# Patient Record
Sex: FEMALE | Race: AMERICAN INDIAN OR ALASKA NATIVE
[De-identification: names, ages, dates, MRNs, and addresses within clinical notes are randomized per-mention and may not be internally consistent; named-entity substitution may affect disease eponyms.]

---

## 2018-01-01 ENCOUNTER — HOSPITAL ENCOUNTER (EMERGENCY)
Dept: HOSPITAL 5 - ED | Age: 63
LOS: 1 days | Discharge: HOME | End: 2018-01-02
Payer: COMMERCIAL

## 2018-01-01 DIAGNOSIS — M25.562: Primary | ICD-10-CM

## 2018-01-01 DIAGNOSIS — M25.512: ICD-10-CM

## 2018-01-01 DIAGNOSIS — M25.552: ICD-10-CM

## 2018-01-01 DIAGNOSIS — Z88.6: ICD-10-CM

## 2018-01-01 LAB
BASOPHILS # (AUTO): 0 K/MM3 (ref 0–0.1)
BASOPHILS NFR BLD AUTO: 0.6 % (ref 0–1.8)
BUN SERPL-MCNC: 12 MG/DL (ref 7–17)
BUN/CREAT SERPL: 30 %
CALCIUM SERPL-MCNC: 9.2 MG/DL (ref 8.4–10.2)
EOSINOPHIL # BLD AUTO: 0.2 K/MM3 (ref 0–0.4)
EOSINOPHIL NFR BLD AUTO: 2.6 % (ref 0–4.3)
HCT VFR BLD CALC: 40.1 % (ref 30.3–42.9)
HEMOLYSIS INDEX: 6
HGB BLD-MCNC: 13.4 GM/DL (ref 10.1–14.3)
LYMPHOCYTES # BLD AUTO: 1.3 K/MM3 (ref 1.2–5.4)
LYMPHOCYTES NFR BLD AUTO: 20.6 % (ref 13.4–35)
MCH RBC QN AUTO: 30 PG (ref 28–32)
MCHC RBC AUTO-ENTMCNC: 34 % (ref 30–34)
MCV RBC AUTO: 90 FL (ref 79–97)
MONOCYTES # (AUTO): 0.6 K/MM3 (ref 0–0.8)
MONOCYTES % (AUTO): 9.5 % (ref 0–7.3)
PLATELET # BLD: 266 K/MM3 (ref 140–440)
RBC # BLD AUTO: 4.46 M/MM3 (ref 3.65–5.03)

## 2018-01-01 PROCEDURE — 80048 BASIC METABOLIC PNL TOTAL CA: CPT

## 2018-01-01 PROCEDURE — 73560 X-RAY EXAM OF KNEE 1 OR 2: CPT

## 2018-01-01 PROCEDURE — 36415 COLL VENOUS BLD VENIPUNCTURE: CPT

## 2018-01-01 PROCEDURE — 73030 X-RAY EXAM OF SHOULDER: CPT

## 2018-01-01 PROCEDURE — 85379 FIBRIN DEGRADATION QUANT: CPT

## 2018-01-01 PROCEDURE — 85025 COMPLETE CBC W/AUTO DIFF WBC: CPT

## 2018-01-01 PROCEDURE — 96372 THER/PROPH/DIAG INJ SC/IM: CPT

## 2018-01-01 PROCEDURE — 73502 X-RAY EXAM HIP UNI 2-3 VIEWS: CPT

## 2018-01-01 PROCEDURE — 99284 EMERGENCY DEPT VISIT MOD MDM: CPT

## 2018-01-01 NOTE — EMERGENCY DEPARTMENT REPORT
ED Extremity Problem HPI





- General


Chief complaint: Back Pain/Injury


Stated complaint: ARMS/LEG PAIN


Time Seen by Provider: 01/01/18 20:12


Source: patient


Mode of arrival: Ambulatory


Limitations: No Limitations





- History of Present Illness


Initial comments: 


62F PMH HLD p/w c/o 3 months of persistent left shoulder, left hip and left 

knee pain. Deneis any trauma, falls, fever, chills, shortness of breath, chest 

pain, paraesthesias. States pain is usually worse at night. has not taken any 

medications at home. pt c/o of left calf soreness, deneis any significant 

swelling. Denies any recent surgery, hormone use, travel or personal history of 

DVT/PE. Family hx of DVT and PE in 2nd degree relatives. 


MD Complaint: extremity pain, extremity swelling


Onset/Timing: 3


-: month(s)


Location: left, upper extremity, lower extremity


-: Yes arthralgia


Severity scale (0 -10): 5


Quality: aching


Consistency: intermittent


Worsens with: nothing





- Related Data


 Previous Rx's











 Medication  Instructions  Recorded  Last Taken  Type


 


Acetaminophen [Acetaminophen TAB] 500 mg PO Q6HR PRN #30 tablet 01/02/18 

Unknown Rx











 Allergies











Allergy/AdvReac Type Severity Reaction Status Date / Time


 


ibuprofen AdvReac  Vomiting Verified 01/01/18 17:20














ED Review of Systems


ROS: 


Stated complaint: ARMS/LEG PAIN


Other details as noted in HPI





Constitutional: denies: chills, fever


Eyes: denies: eye pain, eye discharge, vision change


ENT: denies: ear pain, throat pain


Respiratory: denies: cough, shortness of breath, wheezing


Cardiovascular: denies: chest pain, palpitations


Endocrine: no symptoms reported


Gastrointestinal: denies: abdominal pain, nausea, diarrhea


Genitourinary: denies: urgency, dysuria, discharge


Musculoskeletal: as per HPI, arthralgia.  denies: back pain, joint swelling


Skin: denies: rash, lesions


Neurological: denies: headache, weakness, paresthesias


Psychiatric: denies: anxiety, depression


Hematological/Lymphatic: denies: easy bleeding, easy bruising





ED Past Medical Hx





- Past Medical History


Previous Medical History?: No





- Surgical History


Past Surgical History?: No





- Social History


Smoking Status: Never Smoker


Substance Use Type: None





- Medications


Home Medications: 


 Home Medications











 Medication  Instructions  Recorded  Confirmed  Last Taken  Type


 


Acetaminophen [Acetaminophen TAB] 500 mg PO Q6HR PRN #30 tablet 01/02/18  

Unknown Rx














ED Physical Exam





- General


Limitations: No Limitations


General appearance: alert, in no apparent distress





- Head


Head exam: Present: atraumatic, normocephalic





- Eye


Eye exam: Present: normal appearance





- ENT


ENT exam: Present: mucous membranes moist





- Neck


Neck exam: Present: normal inspection





- Respiratory


Respiratory exam: Present: normal lung sounds bilaterally.  Absent: respiratory 

distress





- Cardiovascular


Cardiovascular Exam: Present: regular rate, normal rhythm.  Absent: systolic 

murmur, diastolic murmur, rubs, gallop





- GI/Abdominal


GI/Abdominal exam: Present: soft, normal bowel sounds





- Extremities Exam


Extremities exam: Present: normal inspection





- Expanded Upper Extremity Exam


  ** Left


Shoulder Exam: Present: normal inspection, full ROM


Upper Arm exam: Present: normal inspection, full ROM


Elbow exam: Present: normal inspection, full ROM


Forearm Wrist exam: Present: normal inspection, full ROM


Hand Wrist exam: Present: normal inspection, full ROM


Neuro motor exam: Present: wrist extension intact, thumb opposition intact, 

thumb IP flexion intact, thumb adduction intact, fingers 2-5 abduction intact


Neurosensory exam: Present: radial nerve intact, ulnar nerve intact, median 

nerve intact


Vascular: Present: normal capillary refill, radial pulse, brachial pulse, ulnar 

pulse





- Expanded Lower Extremity Exam


  ** Left


Hip exam: Present: normal inspection, full ROM


Upper Leg exam: Present: normal inspection, full ROM


Knee exam: Present: normal inspection, full ROM


Lower Leg exam: Present: normal inspection, full ROM


Ankle exam: Present: normal inspection, full ROM


Foot/Toe exam: Present: normal inspection, full ROM


Neuro vascular tendon exam: Present: no vascular compromise (distal DP and PT 

pulses intact)





- Back Exam


Back exam: Present: normal inspection





- Neurological Exam


Neurological exam: Present: alert, oriented X3





- Psychiatric


Psychiatric exam: Present: normal affect, normal mood





- Skin


Skin exam: Present: warm, dry, intact, normal color.  Absent: rash





ED Course


 Vital Signs











  01/01/18 01/02/18





  17:17 00:18


 


Temperature 98.6 F 97.6 F


 


Pulse Rate 72 73


 


Respiratory 18 18





Rate  


 


Blood Pressure 131/78 


 


Blood Pressure  131/74





[Right]  


 


O2 Sat by Pulse 99 100





Oximetry  














ED Medical Decision Making





- Lab Data


Result diagrams: 


 01/01/18 21:15





 01/01/18 21:15





- Medical Decision Making


A/P: Lower extremity pain, musculoskeletal pain


1-case discussed with Dr. Calderon who also examined the patient


2-we emphasized to patient that she must follow-up in the morning for lower 

extremity duplex.  We'll give patient a dose of Lovenox 1.5 mg/kg to cover 

empirically for potential DVT as d-dimer was positive.  Patient agreed to 

return in the morning and was given a lower extremity Doppler order form 

directly in hand with discharge paperwork


3-Tylenol when necessary for pain


 


Critical care attestation.: 


If time is entered above; I have spent that time in minutes in the direct care 

of this critically ill patient, excluding procedure time.








ED Disposition


Clinical Impression: 


Lower extremity pain


Qualifiers:


 Laterality: left Qualified Code(s): M79.605 - Pain in left leg





Disposition: DC-01 TO HOME OR SELFCARE


Is pt being admited?: No


Does the pt Need Aspirin: No


Condition: Stable


Instructions:  Musculoskeletal Pain (ED), Arthralgia (ED)


Additional Instructions: 


PT INSTRUCTED TO REPORT TO VASCULAR LAB IN AM next DAY for LOWER EXTREMITY 

DOPPLER STUDY to RULE OUT DVT


Prescriptions: 


Acetaminophen [Acetaminophen TAB] 500 mg PO Q6HR PRN #30 tablet


 PRN Reason: Pain


Referrals: 


[facility] [Outside] - 3-5 Days


[facility] [Outside] - 3-5 Days


Time of Disposition: 00:01

## 2018-01-01 NOTE — XRAY REPORT
FINAL REPORT



PROCEDURE:  XR HIP 2-3V LT



TECHNIQUE:  LEFT hip radiographs, 2 views each, including AP view

of the pelvis. 



HISTORY:  left hip pain 



COMPARISON:  No prior studies are available for comparison.



FINDINGS:  

Fracture (s) and/or Dislocation(s): None .



Joint space(s): Sclerosis at the sacroiliac joints



Soft tissues: Calcified phleboliths in the pelvis 



Bone mineralization: Normal. 



Foreign bodies: None. 



IMPRESSION:  

No fracture seen.

## 2018-01-01 NOTE — XRAY REPORT
FINAL REPORT



PROCEDURE:  XR KNEE 1-2V LT



TECHNIQUE:  LEFT knee radiographs, AP and lateral views. CPT

32056







HISTORY:  left knee pain 



COMPARISON:  No prior studies are available for comparison.



FINDINGS:  

Fracture (s) and/or Dislocation(s): None .



Alignment: Normal .



Joint space(s): There is joint space narrowing with spurring and

tricompartmental degenerative change.



Soft tissues: Normal .



Bone mineralization: Normal .



Foreign bodies: None .







IMPRESSION:  

Degenerative change. No fracture seen.

## 2018-01-01 NOTE — XRAY REPORT
FINAL REPORT



PROCEDURE:  XR SHOULDER 2+V LT



TECHNIQUE:  LEFT shoulder radiographs including AP views in

internal and external rotation and abduction. CPT 44101



HISTORY:  left shoulder pain 



COMPARISON:  No prior studies are available for comparison.



FINDINGS:  

Fracture (s) and/or Dislocation(s): None .



Joint space(s): Acromioclavicular spurring.



Soft tissues: Normal .



Bone mineralization: Normal .



Foreign bodies: None .







IMPRESSION:  

No fracture. Mild acromioclavicular spurring

## 2018-01-02 ENCOUNTER — HOSPITAL ENCOUNTER (OUTPATIENT)
Dept: HOSPITAL 5 - VAS | Age: 63
Discharge: HOME | End: 2018-01-02
Attending: PHYSICIAN ASSISTANT
Payer: COMMERCIAL

## 2018-01-02 VITALS — DIASTOLIC BLOOD PRESSURE: 74 MMHG | SYSTOLIC BLOOD PRESSURE: 131 MMHG

## 2018-01-02 DIAGNOSIS — M79.662: ICD-10-CM

## 2018-01-02 DIAGNOSIS — M79.661: Primary | ICD-10-CM

## 2018-01-02 PROCEDURE — 93970 EXTREMITY STUDY: CPT

## 2022-04-30 ENCOUNTER — TELEPHONE ENCOUNTER (OUTPATIENT)
Dept: URBAN - METROPOLITAN AREA CLINIC 121 | Facility: CLINIC | Age: 67
End: 2022-04-30

## 2022-05-01 ENCOUNTER — TELEPHONE ENCOUNTER (OUTPATIENT)
Dept: URBAN - METROPOLITAN AREA CLINIC 121 | Facility: CLINIC | Age: 67
End: 2022-05-01